# Patient Record
Sex: FEMALE | Race: WHITE | Employment: UNEMPLOYED | URBAN - METROPOLITAN AREA
[De-identification: names, ages, dates, MRNs, and addresses within clinical notes are randomized per-mention and may not be internally consistent; named-entity substitution may affect disease eponyms.]

---

## 2018-04-24 ENCOUNTER — HOSPITAL ENCOUNTER (EMERGENCY)
Age: 53
Discharge: HOME | End: 2018-04-24
Attending: EMERGENCY MEDICINE
Payer: MEDICAID

## 2018-04-24 VITALS
OXYGEN SATURATION: 98 % | SYSTOLIC BLOOD PRESSURE: 152 MMHG | BODY MASS INDEX: 21.97 KG/M2 | HEART RATE: 92 BPM | TEMPERATURE: 98.1 F | WEIGHT: 140 LBS | HEIGHT: 67 IN | DIASTOLIC BLOOD PRESSURE: 90 MMHG | RESPIRATION RATE: 18 BRPM

## 2018-04-24 DIAGNOSIS — G89.29 EXACERBATION OF CHRONIC BACK PAIN: ICD-10-CM

## 2018-04-24 DIAGNOSIS — M54.9 EXACERBATION OF CHRONIC BACK PAIN: ICD-10-CM

## 2018-04-24 DIAGNOSIS — K08.89 TOOTHACHE: ICD-10-CM

## 2018-04-24 DIAGNOSIS — K02.9 DENTAL CARIES: Primary | ICD-10-CM

## 2018-04-24 PROCEDURE — 99282 EMERGENCY DEPT VISIT SF MDM: CPT

## 2018-04-24 RX ORDER — ALBUTEROL SULFATE 90 UG/1
2 AEROSOL, METERED RESPIRATORY (INHALATION) EVERY 6 HOURS PRN
COMMUNITY

## 2018-04-24 RX ORDER — PENICILLIN V POTASSIUM 500 MG/1
500 TABLET ORAL 4 TIMES DAILY
Qty: 40 TABLET | Refills: 0 | Status: SHIPPED | OUTPATIENT
Start: 2018-04-24 | End: 2018-05-04

## 2018-04-24 RX ORDER — METHYLPREDNISOLONE 4 MG/1
TABLET ORAL
Qty: 1 KIT | Refills: 0 | Status: SHIPPED | OUTPATIENT
Start: 2018-04-24 | End: 2018-04-30

## 2018-04-24 RX ORDER — PROPRANOLOL HYDROCHLORIDE 10 MG/1
10 TABLET ORAL 3 TIMES DAILY
COMMUNITY

## 2018-04-24 ASSESSMENT — PAIN DESCRIPTION - ORIENTATION: ORIENTATION: RIGHT

## 2018-04-24 ASSESSMENT — PAIN SCALES - GENERAL: PAINLEVEL_OUTOF10: 8

## 2018-04-24 ASSESSMENT — PAIN DESCRIPTION - LOCATION: LOCATION: TEETH

## 2021-06-11 ENCOUNTER — APPOINTMENT (OUTPATIENT)
Dept: GENERAL RADIOLOGY | Age: 56
End: 2021-06-11
Payer: MEDICAID

## 2021-06-11 ENCOUNTER — APPOINTMENT (OUTPATIENT)
Dept: ULTRASOUND IMAGING | Age: 56
End: 2021-06-11
Payer: MEDICAID

## 2021-06-11 ENCOUNTER — HOSPITAL ENCOUNTER (EMERGENCY)
Age: 56
Discharge: HOME | End: 2021-06-11
Payer: MEDICAID

## 2021-06-11 VITALS
DIASTOLIC BLOOD PRESSURE: 108 MMHG | TEMPERATURE: 97.4 F | BODY MASS INDEX: 21.66 KG/M2 | OXYGEN SATURATION: 98 % | WEIGHT: 138 LBS | RESPIRATION RATE: 16 BRPM | SYSTOLIC BLOOD PRESSURE: 144 MMHG | HEART RATE: 97 BPM | HEIGHT: 67 IN

## 2021-06-11 DIAGNOSIS — M19.041 OSTEOARTHRITIS OF RIGHT HAND, UNSPECIFIED OSTEOARTHRITIS TYPE: ICD-10-CM

## 2021-06-11 DIAGNOSIS — M79.641 RIGHT HAND PAIN: ICD-10-CM

## 2021-06-11 DIAGNOSIS — M25.531 ACUTE PAIN OF RIGHT WRIST: Primary | ICD-10-CM

## 2021-06-11 PROCEDURE — 73130 X-RAY EXAM OF HAND: CPT

## 2021-06-11 PROCEDURE — 6360000002 HC RX W HCPCS: Performed by: NURSE PRACTITIONER

## 2021-06-11 PROCEDURE — 6370000000 HC RX 637 (ALT 250 FOR IP): Performed by: NURSE PRACTITIONER

## 2021-06-11 PROCEDURE — 93971 EXTREMITY STUDY: CPT

## 2021-06-11 PROCEDURE — 99283 EMERGENCY DEPT VISIT LOW MDM: CPT

## 2021-06-11 PROCEDURE — 96372 THER/PROPH/DIAG INJ SC/IM: CPT

## 2021-06-11 PROCEDURE — 73110 X-RAY EXAM OF WRIST: CPT

## 2021-06-11 RX ORDER — ACETAMINOPHEN 500 MG
1000 TABLET ORAL ONCE
Status: COMPLETED | OUTPATIENT
Start: 2021-06-11 | End: 2021-06-11

## 2021-06-11 RX ORDER — OXYCODONE HYDROCHLORIDE 5 MG/1
5 TABLET ORAL ONCE
Status: COMPLETED | OUTPATIENT
Start: 2021-06-11 | End: 2021-06-11

## 2021-06-11 RX ORDER — METHYLPREDNISOLONE 4 MG/1
TABLET ORAL
Qty: 1 KIT | Status: SHIPPED | OUTPATIENT
Start: 2021-06-11 | End: 2021-06-17

## 2021-06-11 RX ORDER — KETOROLAC TROMETHAMINE 30 MG/ML
30 INJECTION, SOLUTION INTRAMUSCULAR; INTRAVENOUS ONCE
Status: COMPLETED | OUTPATIENT
Start: 2021-06-11 | End: 2021-06-11

## 2021-06-11 RX ORDER — NAPROXEN 500 MG/1
500 TABLET ORAL 2 TIMES DAILY PRN
Qty: 14 TABLET | Refills: 0 | Status: SHIPPED | OUTPATIENT
Start: 2021-06-11 | End: 2021-06-18

## 2021-06-11 RX ADMIN — OXYCODONE HYDROCHLORIDE 5 MG: 5 TABLET ORAL at 12:52

## 2021-06-11 RX ADMIN — KETOROLAC TROMETHAMINE 30 MG: 30 INJECTION, SOLUTION INTRAMUSCULAR; INTRAVENOUS at 12:40

## 2021-06-11 RX ADMIN — ACETAMINOPHEN 1000 MG: 500 TABLET ORAL at 12:41

## 2021-06-11 ASSESSMENT — PAIN DESCRIPTION - PAIN TYPE: TYPE: ACUTE PAIN

## 2021-06-11 ASSESSMENT — PAIN SCALES - GENERAL
PAINLEVEL_OUTOF10: 10

## 2021-06-11 ASSESSMENT — PAIN DESCRIPTION - LOCATION: LOCATION: HAND

## 2021-06-11 NOTE — ED PROVIDER NOTES
[address] [facility]  Department of Emergency Medicine   ED  Encounter Note  Admit Date/RoomTime: 2021 10:28 AM  ED Room:     NAME: Jennifer Murray  : 1965  MRN: [id number]     Chief Complaint:  Hand Pain (right hand, radiates up arm. Swelling to right hand )    History of Present Illness       Jennifer Murray is a 63 y.o. old female who presents to the emergency department by private vehicle, for non-traumatic right radial wrist pain/swelling  and thumb pain which radiates up into her forearm which occured over several days and recently worsened over the past  day(s) prior to arrival.  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 Patient has a history of intermittent pain in the past denies any history of gout. She is right handed. The patients tetanus status is unknown. Since onset the symptoms have been persistent and gradually worsening. Her pain is aggraveated by any movement, any use of, certain movements or pressure on or palpation of painful area and relieved by nothing, as no treatment has been provided prior to this visit. She denies any fever, chills, chest pain, shortness of breath, recent travels or history of blood clots. ROS   Pertinent positives and negatives are stated within HPI, all other systems reviewed and are negative. Past Medical History:  has a past medical history of Anxiety, Asthma, Fall from ladder, Heart murmur, Heart palpitations, Stomach problems, and Thyroid disease. Surgical History:  has a past surgical history that includes Cholecystectomy; Tracheal surgery; and Abdomen surgery. Social History:  reports that she has been smoking cigarettes. She has been smoking about 0.50 packs per day. She has never used smokeless tobacco. She reports current alcohol use. She reports that she does not use drugs. Family History: family history is not on file. Allergies: Tetracyclines & related and Codeine    Physical Exam   Oxygen Saturation Interpretation: Normal.        ED Triage Vitals [06/11/21 0959]   BP Temp Temp src Pulse Resp SpO2 Height Weight   (!) 144/108 97.4 °F (36.3 °C) -- 97 16 98 % 5' 7\" (1.702 m) 138 lb (62.6 kg)         · Constitutional:  Alert, development consistent with age. · HEENT:  NC/NT. Airway patent. · Neck:  Normal ROM. Supple. · Extremity(s):  Right radial wrist and Thumb proximal and distal  IP joint                Tenderness: Moderate as compared to left. Swelling: Mild. Deformity: No.                 ROM: diminished range with pain. Skin:  no wounds, erythema, or swelling. Neurovascular: Motor deficit: none. Sensory deficit: none. Pulse deficit: none. Capillary refill: normal.  · Lymphatics: No lymphangitis or adenopathy noted. · Neurological:  Oriented. Motor functions intact. Lab / Imaging Results   (All laboratory and radiology results have been personally reviewed by myself)  Labs:  No results found for this visit on 06/11/21. Imaging: All Radiology results interpreted by Radiologist unless otherwise noted. 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 Likely dystrophic calcification at the radial   collateral ligament at the base of the thumb. 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 Likely dystrophic calcification at the radial   collateral ligament at the base of the thumb. US DUP UPPER EXTREMITY RIGHT VENOUS   Final Result   No evidence of DVT from right subclavian vein down to right forearm. Hand   was not evaluated. .           ED Course / Medical Decision Making     Medications   ketorolac (TORADOL) injection 30 mg (30 mg Intramuscular Given 6/11/21 1240)   acetaminophen (TYLENOL) tablet 1,000 mg (1,000 mg Oral Given 6/11/21 1241)   oxyCODONE (ROXICODONE) immediate release tablet 5 mg (5 mg Oral Given 6/11/21 1252)    Re evaluaton: Patient reports her son dropped her off he will be back to pick her up and she would like something so she can go home and sleep because she has not slept all night. Advised on arthritic findings of the hand and will refer her to hand surgeon. Consult:   None    Procedure(s):   none    MDM:    Imaging was obtained based on moderate suspicion for fracture / bony abnormality as per history/physical findings. Right wrist x-ray shows osteoarthritis at the corpus likely dystrophic calcification at the radial collateral ligament at the base of the thumb. Osteoarthritis noted at the articulation of the scaphoid with the distal carpal row and patient has no signs of septic joint. She is afebrile, non toxic in appearance and hemodynamically stable. US was negative for DVT. Patient placed in thumb spica splint for symptomatic relief and was medicated with toradol, tylenol and oxycodone in the ED. She will be discharged with medrol dose pack and nsaids and advised on s/s warranting immediate return to the ED for re evaluation. Plan is subsequently for symptom control, limited use and  immobilization with appropriate outpatient follow-up with PCP and orthopedic hand surgeon. .    Plan of Care/Counseling:  Myself: LENA Henao CNP reviewed today's visit with the patient in addition to providing specific details for the plan of care and counseling regarding the diagnosis and prognosis. Questions are answered at this time and are agreeable with the plan. Assessment      1. Acute pain of right wrist    2. Right hand pain    3. Osteoarthritis of right hand, unspecified osteoarthritis type      Plan   Discharge home.   Patient condition is good    New Medications     Discharge Medication List as of 6/11/2021 12:41 PM      START taking these medications    Details   methylPREDNISolone (MEDROL, SHANIA,) 4 MG tablet Take by mouth., Disp-1 kit, R-zeroPrint      naproxen (NAPROSYN) 500 MG tablet Take 1 tablet by mouth 2 times daily as needed for Pain (take with food and full glass of water.), Disp-14 tablet, R-0Print           Electronically signed by LENA Barragan CNP   DD: 6/11/21  **This report was transcribed using voice recognition software. Every effort was made to ensure accuracy; however, inadvertent computerized transcription errors may be present.   END OF ED PROVIDER NOTE      LENA Barragan CNP  06/11/21 [address] LENA Costello CNP  06/11/21 5675